# Patient Record
Sex: FEMALE | Race: WHITE
[De-identification: names, ages, dates, MRNs, and addresses within clinical notes are randomized per-mention and may not be internally consistent; named-entity substitution may affect disease eponyms.]

---

## 2017-01-30 ENCOUNTER — HOSPITAL ENCOUNTER (OUTPATIENT)
Dept: HOSPITAL 45 - C.GI | Age: 62
Discharge: HOME | End: 2017-01-30
Attending: INTERNAL MEDICINE
Payer: COMMERCIAL

## 2017-01-30 VITALS
WEIGHT: 134.28 LBS | BODY MASS INDEX: 22.93 KG/M2 | HEIGHT: 64.02 IN | WEIGHT: 134.28 LBS | HEIGHT: 64.02 IN | BODY MASS INDEX: 22.93 KG/M2

## 2017-01-30 VITALS — DIASTOLIC BLOOD PRESSURE: 65 MMHG | HEART RATE: 69 BPM | SYSTOLIC BLOOD PRESSURE: 112 MMHG | OXYGEN SATURATION: 100 %

## 2017-01-30 DIAGNOSIS — Z88.2: ICD-10-CM

## 2017-01-30 DIAGNOSIS — Z12.11: Primary | ICD-10-CM

## 2017-01-30 DIAGNOSIS — Z80.3: ICD-10-CM

## 2017-01-30 DIAGNOSIS — Z90.89: ICD-10-CM

## 2017-01-30 DIAGNOSIS — Z88.1: ICD-10-CM

## 2017-01-30 DIAGNOSIS — Z85.3: ICD-10-CM

## 2017-01-30 DIAGNOSIS — K64.4: ICD-10-CM

## 2017-01-30 NOTE — ENDO HISTORY AND PHYSICAL
History & Physical


Date of Service:


Jan 30, 2017.


Chief Complaint:


Screening


Referring Physician:


Dr. Thomas


History of Present Illness


62 yo CF who presents for screening colonoscopy.





Past Surgical History


Hx Cardiac Surgery:  No


Hx Internal Defibrillator:  No


Hx Pacemaker:  No


Hx Abdominal Surgery:  Yes (LAPAROSCOPY)


Hx of Implantable Prosthesis:  No


Hx Post-Op Nausea and Vomiting:  No


Hx Cancer Surgery:  Yes (LT BREAST LUMPECTOMY)


Hx Thoracic Surgery:  No


Hx Orthopedic:  No


Hx Urinary Tract Surgery:  No





Family History


None





Social History


Smoking Status:  Never Smoker


Hx Substance Use:  No


Hx Alcohol Use:  No





Allergies


Coded Allergies:  


     Sulfa Drugs (Verified  Allergy, Mild, UNKNOWN "I DON'T REMEMBER", 1/30/17)


     Ciprofloxacin (Verified  Allergy, Unknown, MUSCLE AND JOINT ACHE, 1/30/17)


     Methimazole (Verified  Allergy, Unknown, UNKNOWN "I DON'T REMEMBER", 1/30/ 17)


     Opioid Analgesics (Verified  Adverse Reaction, Severe, PASS OUT, 1/30/17)





Current Medications





 Reported Home Medications








 Medications  Dose


 Route/Sig


 Max Daily Dose Days Date Category


 


 Vitamin B Complex


  (B-Complex


 Vitamins) 1 Tab


 Tab  1 Tab


 PO DAILY


    1/19/17 Reported


 


 Ubiquinol 100 Mg


 Cap  1 Tab


 PO DAILY


    1/19/17 Reported


 


 Kaykay Extract


  (Kaykay (Zingiber


 Officinalis)) 250


 Mg Cap  1 Tab


 PO DAILY


    1/19/17 Reported


 


 Omega 3 (Omega-3


 Fatty Acids) 1


 Cap Cap  1 Tab


 PO DAILY


    1/19/17 Reported


 


 Vitamin C


  (Ascorbic Acid)


 500 Mg Tab  1 Tab


 PO DAILY


    1/19/17 Reported


 


 Selenium 200 Mcg


 Tab  1 Tab


 PO DAILY


    1/19/17 Reported


 


 Vitamin E 400 Iu


  (Vitamin E) 400


 Unit Cap  400 Inter.unit


 PO DAILY


    1/19/17 Reported


 


 Vitamin D3


  (Cholecalciferol)


 2,000 Unit Tab  1 Tab


 PO DAILY


    1/19/17 Reported


 


 Beta Carotene


 25,000 Unit Cap  1 Tab


 PO DAILY


    1/19/17 Reported


 


 Zantac


  (Ranitidine HCl)


 150 Mg Tab  150 Mg


 PO DAILY PRN


    1/19/17 Reported


 


 Klonopin


  (Clonazepam) 0.5


 Mg Tab  0.5 Mg


 PO BID


    1/19/17 Reported


 


 Elavil


  (Amitriptyline


 Hcl) 10 Mg Tab  2 Tabs


 PO HS


    1/19/17 Reported











Vital Signs


Weight (Kilograms):  60.91


Height (Feet):  5


Height (Inches):  4





Physical Exam


General Appearance:  WD/WN, no apparent distress


Respiratory/Chest:  


   Auscultation:  breath sounds normal


Cardiovascular:  


   Heart Auscultation:  RRR


Abdomen:  


   Bowel Sounds:  normal


   Inspection & Palpation:  soft, non-distended, no tenderness, guarding & 

rebound





Assessment and Plan


Assessment:


62 yo CF who presents for screening colonoscopy.








Plan:


Proceed with colonoscopy.

## 2017-01-30 NOTE — ANESTHESIOLOGY PROGRESS NOTE
Anesthesia Post Op Note


Date & Time


Jan 30, 2017 at 16:46





Vital Signs


Pain Intensity:  0





 Vital Signs Past 12 Hours








  Date Time  Temp Pulse Resp B/P Pulse Ox O2 Delivery O2 Flow Rate FiO2


 


1/30/17 15:50  69 16 112/65 100 Room Air  


 


1/30/17 15:35  77 16 103/57 98 Room Air  


 


1/30/17 15:20  80 16 143/67 97 Room Air  


 


1/30/17 14:38 36.9 77 18 136/79 100 Room Air  











Notes


Mental Status:  alert / awake / arousable, participated in evaluation


Pt Amnestic to Procedure:  Yes


Nausea / Vomiting:  adequately controlled


Pain:  adequately controlled


Airway Patency, RR, SpO2:  stable & adequate


BP & HR:  stable & adequate


Hydration State:  stable & adequate


Anesthetic Complications:  no major complications apparent

## 2017-01-30 NOTE — GI REPORT
Procedure Date: 1/30/2017 3:00 PM

Procedure:            Colonoscopy

Indications:          Screening for colorectal malignant neoplasm

Medicines:            Monitored Anesthesia Care

Complications:        No immediate complications.

Estimated Blood Loss: Estimated blood loss: none.

Procedure:            Pre-Anesthesia Assessment:

                      - Prior to the procedure, a History and Physical was 

                      performed, and patient medications and allergies were 

                      reviewed. The patient's tolerance of previous 

                      anesthesia was also reviewed. The risks and benefits of 

                      the procedure and the sedation options and risks were 

                      discussed with the patient. All questions were 

                      answered, and informed consent was obtained. Prior 

                      Anticoagulants: The patient has taken no previous 

                      anticoagulant or antiplatelet agents. ASA Grade 

                      Assessment: II - A patient with mild systemic disease. 

                      After reviewing the risks and benefits, the patient was 

                      deemed in satisfactory condition to undergo the 

                      procedure.

                      After I obtained informed consent, the scope was passed 

                      under direct vision. Throughout the procedure, the 

                      patient's blood pressure, pulse, and oxygen saturations 

                      were monitored continuously. The scope was introduced 

                      through the anus and advanced to the terminal ileum. 

                      The colonoscopy was performed without difficulty. The 

                      patient tolerated the procedure well. The quality of 

                      the bowel preparation was good. The terminal ileum, 

                      ileocecal valve, appendiceal orifice, and rectum were 

                      photographed.

Findings:

     Non-bleeding external hemorrhoids were found during perianal exam. The 

     hemorrhoids were small.

     The exam was otherwise without abnormality.

Impression:           - Non-bleeding external hemorrhoids.

                      - The examination was otherwise normal.

                      - No specimens collected.

Recommendation:       - Resume previous diet.

                      - Continue present medications.

                      - Repeat colonoscopy in 10 years for surveillance.

                      - Return to primary care physician as previously 

                      scheduled.

Darion Armenta DO

1/30/2017 3:24:09 PM

This report has been signed electronically.

Note Initiated On: 1/30/2017 3:00 PM

## 2017-01-30 NOTE — DISCHARGE INSTRUCTIONS
Endoscopy Patient Instructions


Date / Procedure(s) Performed


Jan 30, 2017.


Colonoscopy





Allergy Information


Coded Allergies:  


     Sulfa Drugs (Verified  Allergy, Mild, UNKNOWN "I DON'T REMEMBER", 1/30/17)


     Ciprofloxacin (Verified  Allergy, Unknown, MUSCLE AND JOINT ACHE, 1/30/17)


     Methimazole (Verified  Allergy, Unknown, UNKNOWN "I DON'T REMEMBER", 1/30/ 17)


     Opioid Analgesics (Verified  Adverse Reaction, Severe, PASS OUT, 1/30/17)





Discharge Date / Findings


Jan 30, 2017.


External hemorrhoids





Medication Instructions


OK to resume all medications today as prescribed


 Reported Home Medications








 Medications  Dose


 Route/Sig


 Max Daily Dose Days Date Category


 


 Vitamin B Complex


  (B-Complex


 Vitamins) 1 Tab


 Tab  1 Tab


 PO DAILY


    1/19/17 Reported


 


 Ubiquinol 100 Mg


 Cap  1 Tab


 PO DAILY


    1/19/17 Reported


 


 Kaykay Extract


  (Kaykay (Zingiber


 Officinalis)) 250


 Mg Cap  1 Tab


 PO DAILY


    1/19/17 Reported


 


 Omega 3 (Omega-3


 Fatty Acids) 1


 Cap Cap  1 Tab


 PO DAILY


    1/19/17 Reported


 


 Vitamin C


  (Ascorbic Acid)


 500 Mg Tab  1 Tab


 PO DAILY


    1/19/17 Reported


 


 Selenium 200 Mcg


 Tab  1 Tab


 PO DAILY


    1/19/17 Reported


 


 Vitamin E 400 Iu


  (Vitamin E) 400


 Unit Cap  400 Inter.unit


 PO DAILY


    1/19/17 Reported


 


 Vitamin D3


  (Cholecalciferol)


 2,000 Unit Tab  1 Tab


 PO DAILY


    1/19/17 Reported


 


 Beta Carotene


 25,000 Unit Cap  1 Tab


 PO DAILY


    1/19/17 Reported


 


 Zantac


  (Ranitidine HCl)


 150 Mg Tab  150 Mg


 PO DAILY PRN


    1/19/17 Reported


 


 Klonopin


  (Clonazepam) 0.5


 Mg Tab  0.5 Mg


 PO BID


    1/19/17 Reported


 


 Elavil


  (Amitriptyline


 Hcl) 10 Mg Tab  2 Tabs


 PO HS


    1/19/17 Reported











Provider Instructions





Activity Restrictions





-  No exercising or heavy lifting for 24 hours. 


-  Do not drink alcohol the day of the procedure.


-  Do not drive a car or operate machinery until the day after the procedure.


-  Do not make any important decisions or sign important papers in 24 hours 

after the procedure.





Following Day:





-  Return to full activity which may include returning to work/school.





Diet





Start your diet with liquids and light foods (jello, soup, juice, toast).  Then 

eat your usual diet if not nauseated.





Treatment For Common After Affects





For mild abdominal pain, bloating, or excessive gas:





-  Rest


-  Eat lightly


-  Lie on right side





Follow-Up Information


Follow-up with Ginny Thomas as scheduled





Anesthesia Information





What You Should Know





You have had a procedure that required some medicine to reduce anxiety and 

discomfort. This treatment is called moderate sedation.  


After receiving the treatment, you may be sleepy, but you will be able to 

breathe on your own.  The effects of the treatment may last for several hours.








Follow these instructions along with Activity/Diet recommendations noted above:





*  Do NOT do anything where dizziness or clumsiness would be dangerous.





*  Rest quietly at home today, then you can be up and about tomorrow.





*  Have a responsible person stay with you the rest of today.





*  You may have had an I.V. today.  If so, you may take the dressing off later 

today.





Recommendations


 


Call your doctor if:





*  Trouble breathing 





*  Continuous vomiting for more than 24 hours





*  Temperature above 101 degrees





*  Severe abdominal pain or bloating





*  Pain not relieved by pain medicine ordered





*  There is increased drainage or redness from any incision





*  A large amount of rectal bleeding greater than 2-3 tablespoons. 


   (If you had a polyp/s removed or have hemorrhoids, a small amount of blood -


    from the rectum is to be expected.)





*  You have any unanswered questions or concerns.





IN THE EVENT OF A SERIOUS EMERGENCY, GO TO THE NEAREST EMERGENCY ROOM





       Your discharge instructions were prepared by provider Darion Armenta.


 Patient Instructions Signature Page








Lilian Mojica 











Patient (or Guardian) Signature/Date:____________________________________ I 

have read and understand the instructions given to me by my caregivers.








Caregiver/RN/Doctor Signature/Date:____________________________________








The above-named patient and/or guardian has received patient instructions on 

this date.


























+  Original Patient Signature Page (only) stays with chart.  Please make copy 

for patient.

## 2018-01-23 ENCOUNTER — HOSPITAL ENCOUNTER (OUTPATIENT)
Dept: HOSPITAL 45 - C.MAMM | Age: 63
Discharge: HOME | End: 2018-01-23
Attending: OBSTETRICS & GYNECOLOGY
Payer: COMMERCIAL

## 2018-01-23 DIAGNOSIS — Z12.31: Primary | ICD-10-CM

## 2018-01-24 NOTE — MAMMOGRAPHY REPORT
BILATERAL DIGITAL SCREENING MAMMOGRAM TOMOSYNTHESIS WITH CAD: 1/23/2018

CLINICAL HISTORY: Asymptomatic. Personal history of breast cancer.  





TECHNIQUE:  Breast tomosynthesis in addition to standard 2D mammography was performed. Current study 
was also evaluated with a Computer Aided Detection (CAD) system.  



COMPARISON: Comparison is made to exams dated:  9/29/2016 mammogram, 9/28/2015 mammogram, 9/23/2014 m
ammogram, 9/17/2013 mammogram, 8/2/2012 mammogram, and 1/24/2011 mammogram - [facility]
nter.   



BREAST COMPOSITION:  There are scattered areas of fibroglandular density in both breasts.  



FINDINGS:  There are linear scar markers overlying each breast, denoting areas of prior surgery.  The
 glandular pattern is similar to prior mammograms, with a few scattered benign rim calcifications. No
 suspicious mass, architectural distortion or cluster of suspicious microcalcifications is seen.  



IMPRESSION:  ACR BI-RADS CATEGORY 2: BENIGN

There is no mammographic evidence of malignancy. A 1 year screening mammogram is recommended.  The pa
tient will receive written notification of the results.  





Approximately 10% of breast cancers are not detected with mammography. A negative mammographic report
 should not delay biopsy if a clinically suggestive mass is present.



Randi Gordillo M.D.          

ay/:1/23/2018 19:00:34  



Imaging Technologist: Bonny SOLORIO)(MABEL), [facility]

letter sent: Normal 1/2  

BI-RADS Code: ACR BI-RADS Category 2: Benign